# Patient Record
(demographics unavailable — no encounter records)

---

## 2024-12-10 NOTE — HISTORY OF PRESENT ILLNESS
[FreeTextEntry1] : Rhoda is here for the F/U. She is at her baseline. Denies having had any seizures or events suggestive of Sz. She did have a R-EEG done in Nov. that was normal and a blood test showing Oxcarb. level of 10 and normal CMP We had a long discussion about the potential of coming off the AED She says her memory of her Mom's seizures makes her scared of coming off. I had told her that considering the family HX ,there is a possibility that she might have had a primary generalized epilepsy that was age related and she outgrew of it. Also we had had discussed the odd combination of Ethosuximide and oxcarb. No other issues Mental Status she is active, exercising and denies any change of the mood and memory Has her regular F/U with her MD

## 2024-12-10 NOTE — PHYSICAL EXAM
[FreeTextEntry1] : A/A/Ox3 good mood good attention slightly apprehensive follows 4 step commands CN- normal no drift No dysmetria stable gait

## 2024-12-10 NOTE — ASSESSMENT
[FreeTextEntry1] : Hx of seizure D/O. R/o PGE  she does want to stay on AED  she will discuss the possibility of coming off with her family we had discussed the possibility of coming off gradually and one AED at a time F/U

## 2025-01-16 NOTE — DISCUSSION/SUMMARY
[de-identified] : Chief complaint: Follow-up left shoulder pain  HPI: Patient is a 65-year-old right-hand-dominant female who presents to the office today for follow-up on left shoulder pain which manifested approximately 6-8 weeks ago.  Patient was seen by SAADIA Lopez on 12/30/2024.  At that time she received a corticosteroid injection to the left shoulder.  She reports only getting relief for a couple of days after the injection.  Pain has now returned to its previous level.  Denies any new fall, injury, or trauma.  Has pain with range of motion of the shoulder.  No reported radicular symptoms.  No reported neck pain.  ROS: Positive for left shoulder pain  Physical examination of the LEFT shoulder shows: No appreciable edema No palpable masses  Active forward flexion from 0 to 110 degrees, passive forward flexion from 0 to 150 degrees Active horizontal abduction from 0 to 100 degrees, passive horizontal abduction from 0 to 150 degrees Internal rotation to the left lateral hip Positive O'Briens Test Positive Speed's Positive Martino / Donal Impingement Test Positive Neer's Test Pain and weakness with empty Can test Pain and weakness with infraspinatus/teres minor muscle testing  Assessment/plan: Left shoulder pain, discussed ongoing treatment options with the patient  1.  A prescription for meloxicam 15 mg once daily to be taken as needed with food was sent to the patient's pharmacy, confirmed no contraindications to NSAIDS. Patient denies being on a blood thinner. Denies history of GIB or GI ulcer.  Discussed in detail with the patient that they cannot take over-the-counter NSAIDs including but not limited to ibuprofen, Advil, Aleve, or Motrin while taking this medication.  They can continue to take over-the-counter Tylenol. Discussed that NSAIDs can increase blood pressure. Instructed patient to take blood pressure regularly at home while taking the medication and to discontinue the medication should blood pressure be elevated. Patient verbalized understanding. 2.  Discussed my recommendations for formal physical therapy with the patient, a prescription was provided for this, patient was also provided with a copy of the [facility] shoulder conditioning program 3.  Patient can apply ice or heat to the left shoulder on an as-needed basis with sensory precautions 4.  Patient requesting another intra-articular corticosteroid injection at today's appointment, she was educated on the fact that corticosteroid injections to the same joint need to be  by at least 3 months, she verbalized understanding  Patient will be provided with a 6-week follow-up with me for repeat evaluation, at that time if the patient has not seen improvement with conservative measures can consider an MRI of the left shoulder, patient verbalized understanding of all findings in the office today, she agrees to follow-up as directed